# Patient Record
Sex: FEMALE | Race: WHITE | Employment: UNEMPLOYED | URBAN - METROPOLITAN AREA
[De-identification: names, ages, dates, MRNs, and addresses within clinical notes are randomized per-mention and may not be internally consistent; named-entity substitution may affect disease eponyms.]

---
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
## 2019-01-18 ENCOUNTER — OFFICE VISIT (OUTPATIENT)
Dept: CARDIOLOGY CLINIC | Age: 19
End: 2019-01-18
Payer: COMMERCIAL

## 2019-01-18 VITALS
HEIGHT: 63 IN | BODY MASS INDEX: 25.53 KG/M2 | WEIGHT: 144.1 LBS | DIASTOLIC BLOOD PRESSURE: 62 MMHG | SYSTOLIC BLOOD PRESSURE: 96 MMHG | HEART RATE: 71 BPM

## 2019-01-18 DIAGNOSIS — R94.31 RIGHT AXIS DEVIATION: ICD-10-CM

## 2019-01-18 DIAGNOSIS — R55 VASOVAGAL SYNCOPE: Primary | ICD-10-CM

## 2019-01-18 DIAGNOSIS — R55 SYNCOPE, UNSPECIFIED SYNCOPE TYPE: ICD-10-CM

## 2019-01-18 PROCEDURE — 93000 ELECTROCARDIOGRAM COMPLETE: CPT | Performed by: INTERNAL MEDICINE

## 2019-01-18 PROCEDURE — 99244 OFF/OP CNSLTJ NEW/EST MOD 40: CPT | Performed by: INTERNAL MEDICINE

## 2019-01-18 RX ORDER — NORGESTIMATE AND ETHINYL ESTRADIOL 7DAYSX3 28
KIT ORAL
COMMUNITY
Start: 2019-01-11 | End: 2021-08-13 | Stop reason: SDUPTHER

## 2019-09-06 ENCOUNTER — HOSPITAL ENCOUNTER (OUTPATIENT)
Dept: HOSPITAL 83 - RAD | Age: 19
Discharge: HOME | End: 2019-09-06
Attending: INTERNAL MEDICINE
Payer: COMMERCIAL

## 2019-09-06 DIAGNOSIS — M54.9: ICD-10-CM

## 2019-09-06 DIAGNOSIS — M25.512: Primary | ICD-10-CM

## 2019-09-06 DIAGNOSIS — R07.81: ICD-10-CM

## 2020-01-20 ENCOUNTER — HOSPITAL ENCOUNTER (OUTPATIENT)
Dept: HOSPITAL 83 - RAD | Age: 20
Discharge: HOME | End: 2020-01-20
Attending: INTERNAL MEDICINE
Payer: COMMERCIAL

## 2020-01-20 DIAGNOSIS — J01.00: Primary | ICD-10-CM

## 2020-01-20 LAB
BASOPHILS # BLD AUTO: 0 10*3/UL (ref 0–0.1)
BASOPHILS NFR BLD AUTO: 0.4 % (ref 0–1)
EOSINOPHIL # BLD AUTO: 0.1 10*3/UL (ref 0–0.4)
EOSINOPHIL # BLD AUTO: 0.8 % (ref 1–4)
ERYTHROCYTE [DISTWIDTH] IN BLOOD BY AUTOMATED COUNT: 12.1 % (ref 0–14.5)
HCT VFR BLD AUTO: 39.6 % (ref 37–47)
HGB BLD-MCNC: 13.1 G/DL (ref 12–16)
LYMPHOCYTES # BLD AUTO: 2.2 10*3/UL (ref 1.3–4.4)
LYMPHOCYTES NFR BLD AUTO: 21 % (ref 27–41)
MCH RBC QN AUTO: 29 PG (ref 27–31)
MCHC RBC AUTO-ENTMCNC: 33.1 G/DL (ref 33–37)
MCV RBC AUTO: 87.8 FL (ref 81–99)
MONOCYTES # BLD AUTO: 0.6 10*3/UL (ref 0.1–1)
MONOCYTES NFR BLD MANUAL: 5.8 % (ref 3–9)
NEUT #: 7.6 10*3/UL (ref 2.3–7.9)
NEUT %: 71.5 % (ref 47–73)
NRBC BLD QL AUTO: 0 % (ref 0–0)
PLATELET # BLD AUTO: 426 10*3/UL (ref 130–400)
PMV BLD AUTO: 8.7 FL (ref 9.6–12.3)
RBC # BLD AUTO: 4.51 10*6/UL (ref 4.1–5.1)
WBC NRBC COR # BLD AUTO: 10.7 10*3/UL (ref 4.8–10.8)

## 2021-03-16 ENCOUNTER — HOSPITAL ENCOUNTER (EMERGENCY)
Age: 21
Discharge: HOME | End: 2021-03-16
Payer: COMMERCIAL

## 2021-03-16 VITALS
HEART RATE: 81 BPM | TEMPERATURE: 98.6 F | OXYGEN SATURATION: 99 % | SYSTOLIC BLOOD PRESSURE: 105 MMHG | RESPIRATION RATE: 16 BRPM | DIASTOLIC BLOOD PRESSURE: 59 MMHG

## 2021-03-16 DIAGNOSIS — N94.9 GENITAL LESION, FEMALE: Primary | ICD-10-CM

## 2021-03-16 PROCEDURE — 99282 EMERGENCY DEPT VISIT SF MDM: CPT

## 2021-03-16 RX ORDER — IBUPROFEN 600 MG/1
600 TABLET ORAL EVERY 6 HOURS PRN
Qty: 20 TABLET | Refills: 0 | Status: SHIPPED | OUTPATIENT
Start: 2021-03-16 | End: 2021-08-13 | Stop reason: ALTCHOICE

## 2021-03-16 RX ORDER — CEPHALEXIN 500 MG/1
500 CAPSULE ORAL 4 TIMES DAILY
Qty: 40 CAPSULE | Refills: 0 | Status: SHIPPED | OUTPATIENT
Start: 2021-03-16 | End: 2021-03-26

## 2021-03-16 RX ORDER — HYDROCODONE BITARTRATE AND ACETAMINOPHEN 5; 325 MG/1; MG/1
1 TABLET ORAL EVERY 6 HOURS PRN
Qty: 20 TABLET | Refills: 0 | Status: SHIPPED | OUTPATIENT
Start: 2021-03-16 | End: 2021-03-21

## 2021-03-16 ASSESSMENT — PAIN DESCRIPTION - PAIN TYPE: TYPE: ACUTE PAIN

## 2021-03-16 NOTE — ED PROVIDER NOTES
[facility]  Department of Emergency Medicine   ED  Encounter Note  Admit Date/RoomTime: 3/16/2021  4:59 PM  ED Room:   NAME: Vel Cheng  : 2000  MRN: [id number]     Chief Complaint:  Rash (in groin and on labia on right side onset wednesday , states low grade fevers at home. Tested at obn yesterday for MRSA and Staff)    [address] [place] [address]        Vel Cheng is a 23 y.o. female who presents to the ED with a lesion to her right labia. Patient states 1 week ago she started with a boil to her right labia after having shaved. She states she left alone because she has had them before and they go away on her own. It however got bigger and more painful. She did see her gynecologist in [place] yesterday. She states that her OB swabbed it and even did blood work. Started her on Bactrim which patient is taking. Told the patient that if it got bigger or more painful than to go to the emergency room. Patient states that it is not any worse looking or any bigger, however it is very painful and she was not given anything for pain. Patient does admit she has had boils in the genital area from shaving before without issues. States she has never been diagnosed with herpes, however was tested yesterday at her gynecology office. ROS   Pertinent positives and negatives are stated within HPI, all other systems reviewed and are negative. Past Medical History:  has a past medical history of Anal fissure, Anal skin tag, and Syncope. Surgical History:  has a past surgical history that includes Tonsillectomy (). Social History:  reports that she has never smoked. She has never used smokeless tobacco. She reports that she does not drink alcohol. Family History: family history includes Cancer in her father; Colon Cancer (age of onset: 72) in her maternal grandfather.      Allergies: Latex    PHYSICAL EXAM   Oxygen Saturation Interpretation: Normal.        ED Triage Vitals [03/16/21 1657]   BP Temp Temp src Pulse Resp SpO2 Height Weight   (!) 105/59 98.6 °F (37 °C) -- 81 16 99 % -- --       General:  NAD. Alert and Oriented. Well-appearing. Skin:  Warm, dry. No rashes. Head:  Normocephalic. Atraumatic. Eyes:  EOMI. Conjunctiva normal.  ENT:  Oral mucosa moist.  Airway patent. Neck:  Supple. Normal ROM. Respiratory:  No respiratory distress. No labored breathing. Lungs clear without rales, rhonchi or wheezing. Cardiovascular:  Regular rate. No Murmur. No peripheral edema. Extremities warm and good color. Gu:  Bladder nontender and non distended. No CVA tenderness. External genital exam: Patient does have a lesion to her right labia majora that is firm with a small area of black eschar and ulceration, both less than 1 cm in size. There is surrounding erythema encompassing the right labia majora. The lesion is firm, there is no fluctuance. There is no pustular head or blistering. It is very tender to the touch. No lesions or tenderness to the labia minora, clitoris or vaginal os. There is no lesions, swelling redness to the perineum. No lesions, redness or swelling around the anus. Extremities:  Normal ROM. Nontender to palpation. Atraumatic. Back:  Normal ROM. Nontender to palpation. Neuro:  Alert and Oriented to person, place, time and situation. Normal LOC. Moves all extremities. Speech fluent. Psych:  Calm and Cooperative. Normal thought process. Normal judgement. Lab / Imaging Results   (All laboratory and radiology results have been personally reviewed by myself)  Labs:  No results found for this visit on 03/16/21. Imaging: All Radiology results interpreted by Radiologist unless otherwise noted. No orders to display       ED Course / Medical Decision Making   Medications - No data to display     Re-examination:  3/16/21       Time:   Patients condition .     Consult(s):   None    Procedure(s):   none    MDM:

## 2024-02-02 ENCOUNTER — ANCILLARY PROCEDURE (OUTPATIENT)
Dept: OBGYN CLINIC | Age: 24
End: 2024-02-02
Payer: COMMERCIAL

## 2024-02-02 ENCOUNTER — INITIAL PRENATAL (OUTPATIENT)
Dept: OBGYN CLINIC | Age: 24
End: 2024-02-02
Payer: COMMERCIAL

## 2024-02-02 VITALS
DIASTOLIC BLOOD PRESSURE: 67 MMHG | BODY MASS INDEX: 30.33 KG/M2 | WEIGHT: 171.2 LBS | SYSTOLIC BLOOD PRESSURE: 109 MMHG | HEART RATE: 96 BPM

## 2024-02-02 DIAGNOSIS — O30.042 DICHORIONIC DIAMNIOTIC TWIN PREGNANCY IN SECOND TRIMESTER: ICD-10-CM

## 2024-02-02 DIAGNOSIS — O28.5 ABNORMAL GENETIC TEST DURING PREGNANCY: ICD-10-CM

## 2024-02-02 DIAGNOSIS — Z3A.14 14 WEEKS GESTATION OF PREGNANCY: Primary | ICD-10-CM

## 2024-02-02 DIAGNOSIS — Z34.02 PRIMIGRAVIDA IN SECOND TRIMESTER: ICD-10-CM

## 2024-02-02 LAB
GLUCOSE URINE, POC: NORMAL
PROTEIN UA: NEGATIVE

## 2024-02-02 PROCEDURE — 81002 URINALYSIS NONAUTO W/O SCOPE: CPT | Performed by: OBSTETRICS & GYNECOLOGY

## 2024-02-02 PROCEDURE — 76810 OB US >/= 14 WKS ADDL FETUS: CPT | Performed by: OBSTETRICS & GYNECOLOGY

## 2024-02-02 PROCEDURE — 76805 OB US >/= 14 WKS SNGL FETUS: CPT | Performed by: OBSTETRICS & GYNECOLOGY

## 2024-02-02 PROCEDURE — 99204 OFFICE O/P NEW MOD 45 MIN: CPT | Performed by: OBSTETRICS & GYNECOLOGY

## 2024-02-02 PROCEDURE — 99203 OFFICE O/P NEW LOW 30 MIN: CPT | Performed by: OBSTETRICS & GYNECOLOGY

## 2024-02-02 NOTE — PROGRESS NOTES
Pt here as a new patient twins.  Pt c/o cramping in pelvic region. She is currently on bed rest and off work due to cramping. Being off her feet helps. Denies bleeding/lof. HX of spotting twice since getting pregnant.

## 2024-02-02 NOTE — PROGRESS NOTES
[facility] SPECIALTY CARE [facility] MATERNAL FETAL MEDICINE  [address] [facility] [address]  Dept: [phone]  Loc: [phone]     2024    RE:  Britney Aguila     : 2000   AGE: 23 y.o.     Dear Dr. Aragon,    Thank you for allowing me to see Britney Aguila.  As I'm sure you will recall, Britney Aguila is a 23 y.o. [id number]'s last menstrual period was 10/23/2023. Estimated Date of Delivery: 24 at 14w4d seen in our office today for the following:    REASON FOR VISIT: Growth    Patient Active Problem List    Diagnosis Date Noted    14 weeks gestation of pregnancy 2024    Dichorionic diamniotic twin pregnancy in second trimester 2024    Abnormal genetic test during pregnancy 2024    Primigravida in second trimester 2024        PAST HISTORY:  OB History    Para Term  AB Living   1             SAB IAB Ectopic Molar Multiple Live Births                    # Outcome Date GA Lbr Henrry/2nd Weight Sex Delivery Anes PTL Lv   1 Current                   MEDICAL:  Past Medical History:   Diagnosis Date    Anal fissure     Anal skin tag     Syncope         SURGICAL:  Past Surgical History:   Procedure Laterality Date    TONSILLECTOMY         ALLERGIES:    Allergies   Allergen Reactions    Latex Rash         MEDICATIONS:    Current Outpatient Medications   Medication Sig Dispense Refill    [id number] PO Take by mouth      vitamin D (ERGOCALCIFEROL) 1.25 MG (77478 UT) CAPS capsule        No current facility-administered medications for this visit.        Social History     Socioeconomic History    Marital status: Single     Spouse name: None    Number of children: None    Years of education: None    Highest education level: None   Tobacco Use    Smoking status: Never    Smokeless tobacco: Never   Vaping Use    Vaping Use: Never used   Substance and Sexual Activity    Alcohol use: Yes     Comment: social

## 2024-03-01 ENCOUNTER — ROUTINE PRENATAL (OUTPATIENT)
Dept: OBGYN CLINIC | Age: 24
End: 2024-03-01
Payer: COMMERCIAL

## 2024-03-01 ENCOUNTER — ANCILLARY PROCEDURE (OUTPATIENT)
Dept: OBGYN CLINIC | Age: 24
End: 2024-03-01
Payer: COMMERCIAL

## 2024-03-01 VITALS
SYSTOLIC BLOOD PRESSURE: 107 MMHG | BODY MASS INDEX: 31.35 KG/M2 | WEIGHT: 177 LBS | HEART RATE: 93 BPM | DIASTOLIC BLOOD PRESSURE: 73 MMHG

## 2024-03-01 DIAGNOSIS — O30.042 DICHORIONIC DIAMNIOTIC TWIN PREGNANCY IN SECOND TRIMESTER: Primary | ICD-10-CM

## 2024-03-01 DIAGNOSIS — Q90.9 TRISOMY 21: ICD-10-CM

## 2024-03-01 PROBLEM — Z3A.18 18 WEEKS GESTATION OF PREGNANCY: Status: ACTIVE | Noted: 2024-02-02

## 2024-03-01 LAB
GLUCOSE URINE, POC: NEGATIVE
PROTEIN UA: NEGATIVE

## 2024-03-01 PROCEDURE — 81002 URINALYSIS NONAUTO W/O SCOPE: CPT | Performed by: OBSTETRICS & GYNECOLOGY

## 2024-03-01 PROCEDURE — 76811 OB US DETAILED SNGL FETUS: CPT | Performed by: OBSTETRICS & GYNECOLOGY

## 2024-03-01 PROCEDURE — 99213 OFFICE O/P EST LOW 20 MIN: CPT | Performed by: OBSTETRICS & GYNECOLOGY

## 2024-03-01 PROCEDURE — 76812 OB US DETAILED ADDL FETUS: CPT | Performed by: OBSTETRICS & GYNECOLOGY

## 2024-03-01 NOTE — PROGRESS NOTES
Patient cramping and feeling pressure. Has been put on bedrest and is helping. No bleeding. Feeling flutters  
4 weeks for growth ultrasound.  3.  Fetal echocardiograms were ordered for both babies.  4.  As long as the babies are concordant and no abnormalities are noted begin weekly biophysical profiles with NSTs at 32 weeks gestation.  5.  Delivery between 37 and 38 weeks gestation.  6.  Follow-up would be otherwise as clinically indicated.    The patient is to continue to follow with you in your office for ongoing obstetric care.        PLAN:    As noted above or sooner prn.    Sincerely,        Morteza Webb MD

## 2024-03-29 ENCOUNTER — ANCILLARY PROCEDURE (OUTPATIENT)
Dept: OBGYN CLINIC | Age: 24
End: 2024-03-29
Payer: COMMERCIAL

## 2024-03-29 ENCOUNTER — ROUTINE PRENATAL (OUTPATIENT)
Dept: OBGYN CLINIC | Age: 24
End: 2024-03-29
Payer: COMMERCIAL

## 2024-03-29 VITALS
DIASTOLIC BLOOD PRESSURE: 72 MMHG | HEART RATE: 98 BPM | SYSTOLIC BLOOD PRESSURE: 107 MMHG | WEIGHT: 189 LBS | BODY MASS INDEX: 33.48 KG/M2

## 2024-03-29 DIAGNOSIS — Z3A.22 22 WEEKS GESTATION OF PREGNANCY: Primary | ICD-10-CM

## 2024-03-29 LAB
GLUCOSE URINE, POC: NORMAL
PROTEIN UA: NEGATIVE

## 2024-03-29 PROCEDURE — 76816 OB US FOLLOW-UP PER FETUS: CPT | Performed by: OBSTETRICS & GYNECOLOGY

## 2024-03-29 PROCEDURE — 81002 URINALYSIS NONAUTO W/O SCOPE: CPT | Performed by: OBSTETRICS & GYNECOLOGY

## 2024-03-29 PROCEDURE — 99213 OFFICE O/P EST LOW 20 MIN: CPT | Performed by: OBSTETRICS & GYNECOLOGY

## 2024-03-29 NOTE — PROGRESS NOTES
Patient is here today for anatomy twins. Denies any vaginal bleeding, cramping, or leakage of fluids.  Patient reports good fetal movement in both babies.   
History   Problem Relation Age of Onset    Breast Cancer Maternal Grandmother     Colon Cancer Maternal Grandfather 65    Heart Surgery Maternal Grandfather     Cancer Father         skin    Liver Disease Mother               PHYSICAL EXAMINATION:  /72   Pulse 98   Wt 85.7 kg (189 lb)   LMP 10/23/2023   Body mass index is 33.48 kg/m².      Urine dipstick:  Results for POC orders placed in visit on 03/29/24   POCT urine qual dipstick protein   Result Value Ref Range    Protein, UA Negative Negative   POCT urine qual dipstick glucose   Result Value Ref Range    Glucose, UA POC neg         An ultrasound evaluation was done in our office today. Please refer to the enclosed copy of the ultrasound report for further information.    Discussion:  This is a dichorionic diamniotic twin gestation in a vertex vertex presentation.  Fetal cardiac motion and fetal motion is detected for both babies and appears to be grossly normal.  There is been appropriate interval growth.  Both babies are AGA.  They are at the 46th and the 42nd percentile.  No obvious anomalies noted.  We were unable to completely clear the spinal baby B.  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  The placenta is posterior and fundal respectively.  Thick dividing membrane is noted.  Amniotic fluid volumes are equal and symmetrical.  There is 3% discordance between the 2 babies    IMPRESSION:  Twin dichorionic diamniotic intrauterine gestation at 22w4d weeks with fetal biometry consistent with dates.   2. The fetal anatomy appears normal for both fetuses.  3. The twins are concordant.  4. The amniotic fluid volume is equal and symmetrical and the placenta is posterior and fundal.    RECOMMENDATIONS:  Each of the recommendations were discussed with the patient:  1. Twin dichorionic diamniotic intrauterine gestation at 22w4d weeks

## 2024-04-26 ENCOUNTER — ROUTINE PRENATAL (OUTPATIENT)
Dept: OBGYN CLINIC | Age: 24
End: 2024-04-26
Payer: COMMERCIAL

## 2024-04-26 ENCOUNTER — ANCILLARY PROCEDURE (OUTPATIENT)
Dept: OBGYN CLINIC | Age: 24
End: 2024-04-26
Payer: COMMERCIAL

## 2024-04-26 VITALS
SYSTOLIC BLOOD PRESSURE: 110 MMHG | HEART RATE: 105 BPM | WEIGHT: 196.9 LBS | BODY MASS INDEX: 34.88 KG/M2 | DIASTOLIC BLOOD PRESSURE: 73 MMHG

## 2024-04-26 DIAGNOSIS — O30.042 DICHORIONIC DIAMNIOTIC TWIN PREGNANCY IN SECOND TRIMESTER: ICD-10-CM

## 2024-04-26 DIAGNOSIS — O28.5 ABNORMAL GENETIC TEST DURING PREGNANCY: ICD-10-CM

## 2024-04-26 DIAGNOSIS — Z3A.26 26 WEEKS GESTATION OF PREGNANCY: Primary | ICD-10-CM

## 2024-04-26 DIAGNOSIS — Q90.9 TRISOMY 21: ICD-10-CM

## 2024-04-26 LAB
GLUCOSE URINE, POC: NEGATIVE
PROTEIN UA: NEGATIVE

## 2024-04-26 PROCEDURE — 81002 URINALYSIS NONAUTO W/O SCOPE: CPT | Performed by: OBSTETRICS & GYNECOLOGY

## 2024-04-26 PROCEDURE — 76805 OB US >/= 14 WKS SNGL FETUS: CPT | Performed by: OBSTETRICS & GYNECOLOGY

## 2024-04-26 PROCEDURE — 99213 OFFICE O/P EST LOW 20 MIN: CPT | Performed by: OBSTETRICS & GYNECOLOGY

## 2024-04-26 PROCEDURE — 76810 OB US >/= 14 WKS ADDL FETUS: CPT | Performed by: OBSTETRICS & GYNECOLOGY

## 2024-04-26 NOTE — PATIENT INSTRUCTIONS
Please arrive for your scheduled appointment at least 15 minutes early with your actual insurance card+ a photo ID. Also if you need any refills ordered or have questions, it may take up 48 hours to reply. Please allow ample time for your refills. Call me when you use last refill. Thank you for your cooperation. You might be having an NST at your next appt. Please eat a large snack or breakfast before coming to office. Thank youCall your primary obstetrician with bleeding, leaking of fluid, abdominal tenderness, headache, blurry vision, epigastric pain and increased urinary frequency.If you are experiencing an emergency and need immediate help, call 911 or go to go emergency room or labor and delivery. Do kick counts after dinner. Call your primary obstetrician if less than 10 kicks in 2 hours after dinner.     Call your primary obstetrician with bleeding, leaking of fluid, abdominal tenderness, headache, blurry vision, epigastric pain and increased urinary frequency.if you are sick, not feeling well or have an infectious process going on please reschedule your appointment by calling [phone]. Also if any family members are not feeling well, please do not bring them to your appointment. We appreciate your cooperation. We are doing this in order to protect our pregnant mothers+ their babies.if you are sick, not feeling well or have an infectious process going on please reschedule your appointment by calling [phone]. Also if any family members are not feeling well, please do not bring them to your appointment. We appreciate your cooperation. We are doing this in order to protect our pregnant mothers+ their babies.

## 2024-04-26 NOTE — PROGRESS NOTES
Pt here for follow up.  Pt had some watery discharge and OB swabed her to make sure it wasn't amniotic fluid and it was not. This was about 2 wks ago. Pt has not had any issues since then. Pt denies bleeding/cramping. Pt states good fetal movement.  
social    Drug use: Never    Sexual activity: Yes     Partners: Male          FAMILY MEDICAL HISTORY:   Family History   Problem Relation Age of Onset    Breast Cancer Maternal Grandmother     Colon Cancer Maternal Grandfather 65    Heart Surgery Maternal Grandfather     Cancer Father         skin    Liver Disease Mother               PHYSICAL EXAMINATION:  /73   Pulse (!) 105   Wt 89.3 kg (196 lb 14.4 oz)   LMP 10/23/2023   Body mass index is 34.88 kg/m².      Urine dipstick:  Results for POC orders placed in visit on 04/26/24   POCT urine qual dipstick protein   Result Value Ref Range    Protein, UA Negative Negative   POCT urine qual dipstick glucose   Result Value Ref Range    Glucose, UA POC Negative         An ultrasound evaluation was done in our office today. Please refer to the enclosed copy of the ultrasound report for further information.    Discussion:  This is a dichorionic diamniotic twin gestation in a vertex breech presentation.  Fetal cardiac motion and fetal motion is observed for both babies and appears to be grossly normal.  There is been appropriate interval growth.  The babies are AGA at the 48th and 58th percentiles respectively.  924 g 2 pounds 1 ounce and 1005 g 2 pounds 3 ounces.  Baby A has an aberrant right subclavian artery other than that no anomalies are noted.  The placenta is posterior right and anterior fundal.  Thick dividing membrane is noted.  5.  There is 8% discordance between the 2 babies.     IMPRESSION:  Twin dichorionic diamniotic intrauterine gestation at 26w4d weeks with fetal biometry consistent with dates.   2. The fetal anatomy appears normal for both fetuses.  3. The twins are concordant.  4. The amniotic fluid volume is equal and symmetrical and the placenta is posterior right anterior fundal.    RECOMMENDATIONS:  Each of the recommendations were discussed with the patient:  1. Twin dichorionic diamniotic intrauterine gestation at 26w4d weeks with fetal